# Patient Record
Sex: FEMALE | Race: WHITE | NOT HISPANIC OR LATINO | Employment: FULL TIME | URBAN - METROPOLITAN AREA
[De-identification: names, ages, dates, MRNs, and addresses within clinical notes are randomized per-mention and may not be internally consistent; named-entity substitution may affect disease eponyms.]

---

## 2021-02-25 ENCOUNTER — TELEPHONE (OUTPATIENT)
Dept: ENDOCRINOLOGY | Facility: CLINIC | Age: 51
End: 2021-02-25

## 2021-02-25 NOTE — TELEPHONE ENCOUNTER
Spoke with patient.  Wanted to know if the COVID vaccine would affect her labs.  Per Dr. Howell, not that he is aware of.  Patient verbalized understanding.

## 2021-02-25 NOTE — TELEPHONE ENCOUNTER
Pt is scheduled to be seen in the office 03/23, she wants to know if she should get her covid vaccine before or after her appt.   [phone]

## 2021-03-23 ENCOUNTER — OFFICE VISIT (OUTPATIENT)
Dept: ENDOCRINOLOGY | Facility: CLINIC | Age: 51
End: 2021-03-23

## 2021-03-23 VITALS
HEIGHT: 67 IN | DIASTOLIC BLOOD PRESSURE: 60 MMHG | WEIGHT: 152 LBS | BODY MASS INDEX: 23.86 KG/M2 | TEMPERATURE: 98.2 F | SYSTOLIC BLOOD PRESSURE: 118 MMHG

## 2021-03-23 DIAGNOSIS — E05.20 TOXIC MULTINODULAR GOITER: Primary | ICD-10-CM

## 2021-03-23 DIAGNOSIS — E06.3 HASHIMOTO'S THYROIDITIS: ICD-10-CM

## 2021-03-23 PROCEDURE — 76536 US EXAM OF HEAD AND NECK: CPT | Performed by: INTERNAL MEDICINE

## 2021-03-23 PROCEDURE — 99213 OFFICE O/P EST LOW 20 MIN: CPT | Performed by: INTERNAL MEDICINE

## 2021-03-23 RX ORDER — ESTRADIOL 0.05 MG/D
FILM, EXTENDED RELEASE TRANSDERMAL DAILY
COMMUNITY
Start: 2021-03-07 | End: 2022-03-28

## 2021-03-23 RX ORDER — LORATADINE 10 MG/1
TABLET ORAL AS NEEDED
COMMUNITY
End: 2022-03-28 | Stop reason: ALTCHOICE

## 2021-03-23 RX ORDER — ERGOCALCIFEROL (VITAMIN D2) 10 MCG
400 TABLET ORAL DAILY
COMMUNITY
End: 2021-09-22 | Stop reason: SDUPTHER

## 2021-03-23 RX ORDER — CLONIDINE HYDROCHLORIDE 0.2 MG/1
1 TABLET ORAL DAILY
COMMUNITY
Start: 2021-01-22

## 2021-03-23 NOTE — PROGRESS NOTES
"     Office Note      Date: 2021  Patient Name: Veronica Harvey  MRN: [id number]  : 1970    Chief Complaint   Patient presents with   • Follow-up   • Thyroid Problem       History of Present Illness:   Veronica Harvey is a 50 y.o. female who presents for Follow-up and Thyroid Problem    She isn't taking any thyroid meds. She denies any excess iodine intake. She denies any biotin use. She hasn't noted any change in the size of her neck. She denies any compressive sxs. She c/o fatigue - isn't sleeping well. She notes mild hot flashes.    Because of her family h/o thyroid cancer in her sister, she had neck u/s done 3/12/20 that showed multiple thyroid nodules bilaterally. She had thyroid scan 20 that showed mildly increased uptake with multiple bilateral hot nodules.     Subjective      Review of Systems:   Review of Systems   Constitutional: Positive for fatigue.   Cardiovascular: Negative.    Gastrointestinal: Negative.    Endocrine: Positive for heat intolerance.       The following portions of the patient's history were reviewed and updated as appropriate: allergies, current medications, past family history, past medical history, past social history, past surgical history and problem list.    Objective     Visit Vitals  /60   Temp 98.2 °F (36.8 °C) (Infrared)   Ht 170.2 cm (67\")   Wt 68.9 kg (152 lb)   BMI 23.81 kg/m²       Physical Exam:  Physical Exam  Constitutional:       Appearance: Normal appearance.   Neurological:      Mental Status: She is alert.         Labs:    TSH  No results found for: TSHBASE     Free T4  No results found for: FREET4    T3  No results found for: B2WPMQG      TPO  No results found for: THYROIDAB    TG AB  No results found for: THGAB    TG  No results found for: THYROGLB    CBC w/DIFF  No results found for: WBC, RBC, HGB, HCT, MCV, MCH, MCHC, RDW, RDWSD, MPV, PLT, NEUTRORELPCT, LYMPHORELPCT, MONORELPCT, EOSRELPCT, BASORELPCT, AUTOIGPER, NEUTROABS, LYMPHSABS, " MONOSABS, EOSABS, BASOSABS, AUTOIGNUM, NRBC        Assessment / Plan      Assessment & Plan:  Diagnoses and all orders for this visit:    1. Toxic multinodular goiter (Primary)  Assessment & Plan:  A neck u/s was performed today.  This revealed multiple bilateral thyroid nodules.  There were at least 4 nodules in each lobe.  The largest was anteriorly in the right lobe and was 1.4cm in size with mixed density with central hyperechoic area.  The nodules in the left lobe were all <1cm.  No abnormal lymph nodes were seen.    Orders:  -     US Thyroid    2. Hashimoto's thyroiditis  Assessment & Plan:  She had labs done last month.  The TSH was normal at 1.16.  The TPO and TG abs were high.          Return in about 6 months (around 9/23/2021) for Recheck with TSH, free T4.    Mingo Howell MD   03/23/2021

## 2021-03-23 NOTE — ASSESSMENT & PLAN NOTE
A neck u/s was performed today.  This revealed multiple bilateral thyroid nodules.  There were at least 4 nodules in each lobe.  The largest was anteriorly in the right lobe and was 1.4cm in size with mixed density with central hyperechoic area.  The nodules in the left lobe were all <1cm.  No abnormal lymph nodes were seen.

## 2021-04-06 ENCOUNTER — TELEPHONE (OUTPATIENT)
Dept: ENDOCRINOLOGY | Facility: CLINIC | Age: 51
End: 2021-04-06

## 2021-04-06 NOTE — TELEPHONE ENCOUNTER
PATIENT NEEDS DR. HARDEN TO SIGN OFF ON HER LAB RESULTS AND THYROID U/S REPORT IN ORDER FOR HER TO ACCESS THE REPORTS IN MY CHART. PLEASE SIGN OFF ON THESE REPORTS FOR PATIENT TO HAVE ACCESS TO SEE THEM.

## 2021-09-22 ENCOUNTER — OFFICE VISIT (OUTPATIENT)
Dept: ENDOCRINOLOGY | Facility: CLINIC | Age: 51
End: 2021-09-22

## 2021-09-22 VITALS
HEART RATE: 77 BPM | OXYGEN SATURATION: 97 % | SYSTOLIC BLOOD PRESSURE: 116 MMHG | HEIGHT: 67 IN | BODY MASS INDEX: 24.33 KG/M2 | DIASTOLIC BLOOD PRESSURE: 74 MMHG | WEIGHT: 155 LBS

## 2021-09-22 DIAGNOSIS — E06.3 HASHIMOTO'S THYROIDITIS: ICD-10-CM

## 2021-09-22 DIAGNOSIS — E05.20 TOXIC MULTINODULAR GOITER: Primary | ICD-10-CM

## 2021-09-22 PROCEDURE — 99213 OFFICE O/P EST LOW 20 MIN: CPT | Performed by: INTERNAL MEDICINE

## 2021-09-22 RX ORDER — PROGESTERONE 100 MG/1
100 CAPSULE ORAL DAILY
COMMUNITY
End: 2022-03-28

## 2021-09-22 RX ORDER — ERGOCALCIFEROL 1.25 MG/1
50000 CAPSULE ORAL WEEKLY
COMMUNITY

## 2021-09-22 NOTE — PROGRESS NOTES
"     Office Note      Date: 2021  Patient Name: Veronica Harvey  MRN: [id number]  : 1970    Chief Complaint   Patient presents with   • Goiter       History of Present Illness:   Veronica Harvey is a 51 y.o. female who presents for Goiter    She isn't taking any thyroid meds. She denies any excess iodine intake. She denies any biotin use. She hasn't noted any change in the size of her neck. She denies any compressive sxs. She c/o fatigue - isn't sleeping well. She notes mild hot flashes.    She had labs done last month.  TSH was 1.01.  T4 and T3 were normal.  TPO ab was again positive.     Because of her family h/o thyroid cancer in her sister, she had neck u/s done 3/12/20 that showed multiple thyroid nodules bilaterally. She had thyroid scan 20 that showed mildly increased uptake with multiple bilateral hot nodules. Neck u/s 3/2021 showed multiple bilateral thyroid nodules.    Subjective      Review of Systems:   Review of Systems   Constitutional: Negative.    Cardiovascular: Negative.    Gastrointestinal: Negative.    Endocrine: Positive for heat intolerance.       The following portions of the patient's history were reviewed and updated as appropriate: allergies, current medications, past family history, past medical history, past social history, past surgical history and problem list.    Objective     Visit Vitals  /74   Pulse 77   Ht 170.2 cm (67\")   Wt 70.3 kg (155 lb)   SpO2 97%   BMI 24.28 kg/m²       Physical Exam:  Physical Exam  Constitutional:       Appearance: Normal appearance.   Neck:      Thyroid: Thyroid mass present.      Comments: Thyroid firm and normal size with possible nodule in right lobe - slight larger than 1cm - freely movable  Lymphadenopathy:      Cervical: No cervical adenopathy.   Neurological:      Mental Status: She is alert.         Labs:    TSH  No results found for: TSHBASE     Free T4  No results found for: FREET4    T3  No results found for: [id number]      " TPO  No results found for: THYROIDAB    TG AB  No results found for: THGAB    TG  No results found for: THYROGLB    CBC w/DIFF  Lab Results   Component Value Date    WBC 3.8 (L) 06/24/2021    RBC 4.37 06/24/2021    HGB 13.3 06/24/2021    HCT 39.0 06/24/2021    MCV 89.2 06/24/2021    MCH 30.5 06/24/2021    MCHC 34.3 06/24/2021    RDW 12.4 06/24/2021    MPV 8.0 06/24/2021     06/24/2021    NEUTRORELPCT 45.7 06/24/2021    MONORELPCT 8.6 06/24/2021    EOSRELPCT 1.4 06/24/2021    BASORELPCT 0.6 06/24/2021    NEUTROABS 1.7 06/24/2021    LYMPHSABS 1.7 06/24/2021    MONOSABS 0.3 06/24/2021    EOSABS 0.1 06/24/2021    BASOSABS 0.0 06/24/2021           Assessment / Plan      Assessment & Plan:  Diagnoses and all orders for this visit:    1. Toxic multinodular goiter (Primary)  Assessment & Plan:  Goiter stable tto palpation.  Plan for another u/s in 6 months.    Orders:  -     Cancel: TSH; Future  -     Cancel: T4, Free; Future    2. Hashimoto's thyroiditis  Assessment & Plan:  She remains euthyroid.  Continue to monitor TSH.        Return in about 6 months (around 3/22/2022) for Recheck with TSH, free T4, neck u/s.    Mingo Howell MD   09/22/2021

## 2022-03-28 ENCOUNTER — OFFICE VISIT (OUTPATIENT)
Dept: ENDOCRINOLOGY | Facility: CLINIC | Age: 52
End: 2022-03-28

## 2022-03-28 VITALS
SYSTOLIC BLOOD PRESSURE: 124 MMHG | HEIGHT: 67 IN | DIASTOLIC BLOOD PRESSURE: 72 MMHG | HEART RATE: 80 BPM | BODY MASS INDEX: 24.33 KG/M2 | OXYGEN SATURATION: 97 % | WEIGHT: 155 LBS

## 2022-03-28 DIAGNOSIS — E05.20 TOXIC MULTINODULAR GOITER: Primary | ICD-10-CM

## 2022-03-28 DIAGNOSIS — E06.3 HASHIMOTO'S THYROIDITIS: ICD-10-CM

## 2022-03-28 PROCEDURE — 76536 US EXAM OF HEAD AND NECK: CPT | Performed by: INTERNAL MEDICINE

## 2022-03-28 PROCEDURE — 99213 OFFICE O/P EST LOW 20 MIN: CPT | Performed by: INTERNAL MEDICINE

## 2022-03-28 RX ORDER — HYDROXYZINE PAMOATE 25 MG/1
25 CAPSULE ORAL AS NEEDED
COMMUNITY
Start: 2022-03-18

## 2022-03-28 RX ORDER — ESTRADIOL 0.04 MG/D
1 FILM, EXTENDED RELEASE TRANSDERMAL 2 TIMES DAILY
COMMUNITY
Start: 2022-01-26

## 2022-03-28 RX ORDER — CHOLECALCIFEROL (VITAMIN D3) 25 MCG
1 TABLET,CHEWABLE ORAL DAILY
COMMUNITY
Start: 2022-03-18

## 2022-03-28 NOTE — PROGRESS NOTES
"     Office Note      Date: 2022  Patient Name: Veronica Harvey  MRN: [id number]  : 1970    Chief Complaint   Patient presents with   • Goiter       History of Present Illness:   Veronica Harvey is a 51 y.o. female who presents for Goiter    She isn't taking any thyroid meds. She denies any excess iodine intake. She denies any biotin use. She hasn't noted any change in the size of her neck. She denies any compressive sxs. She c/o fatigue - isn't sleeping well.  She has been started on vistaril recently but stopped it due to side effects. She notes mild hot flashes.     Because of her family h/o thyroid cancer in her sister, she had neck u/s done 3/12/20 that showed multiple thyroid nodules bilaterally. She had thyroid scan 20 that showed mildly increased uptake with multiple bilateral hot nodules. Neck u/s 3/2021 showed multiple bilateral thyroid nodules.    Subjective      Review of Systems:   Review of Systems   Constitutional: Positive for fatigue.   Cardiovascular: Negative.    Gastrointestinal: Negative.    Endocrine: Negative.        The following portions of the patient's history were reviewed and updated as appropriate: allergies, current medications, past family history, past medical history, past social history, past surgical history and problem list.    Objective     Visit Vitals  /72   Pulse 80   Ht 170.2 cm (67\")   Wt 70.3 kg (155 lb)   SpO2 97%   BMI 24.28 kg/m²       Physical Exam:  Physical Exam  Constitutional:       Appearance: Normal appearance.   Neurological:      Mental Status: She is alert.         Labs:    TSH  No results found for: TSHBASE     Free T4  No results found for: FREET4    T3  No results found for: Q3PHMIE      TPO  No results found for: THYROIDAB    TG AB  No results found for: THGAB    TG  No results found for: THYROGLB    CBC w/DIFF  Lab Results   Component Value Date    WBC 3.8 (L) 2021    RBC 4.37 2021    HGB 13.3 2021    HCT 39.0 " 06/24/2021    MCV 89.2 06/24/2021    MCH 30.5 06/24/2021    MCHC 34.3 06/24/2021    RDW 12.4 06/24/2021    MPV 8.0 06/24/2021     06/24/2021    NEUTRORELPCT 45.7 06/24/2021    MONORELPCT 8.6 06/24/2021    EOSRELPCT 1.4 06/24/2021    BASORELPCT 0.6 06/24/2021    NEUTROABS 1.7 06/24/2021    LYMPHSABS 1.7 06/24/2021    MONOSABS 0.3 06/24/2021    EOSABS 0.1 06/24/2021    BASOSABS 0.0 06/24/2021           Assessment / Plan      Assessment & Plan:  Diagnoses and all orders for this visit:    1. Toxic multinodular goiter (Primary)  Assessment & Plan:  A neck u/s was performed today.  This revealed multiple bilateral thyroid nodules.  The largest was mixed density and in the right lobe.  It measured 1.6cm.  This may be slightly larger than u/s done 3/2021.  The largest in the left lobe was <1cm in size.  No abnormal lymph nodes were seen.  This appears relatively stable.      Plan for another u/s in a year.    Orders:  -     Cancel: TSH; Future  -     Cancel: T4, Free; Future  -     US Thyroid    2. Hashimoto's thyroiditis  Assessment & Plan:  Recent labs show she is euthyroid.  Thyroid antibodies have decreased some.          Return in about 1 year (around 3/28/2023) for Recheck with TSH, free T4, neck u/s.    Mingo Howell MD   03/28/2022

## 2022-03-28 NOTE — ASSESSMENT & PLAN NOTE
A neck u/s was performed today.  This revealed multiple bilateral thyroid nodules.  The largest was mixed density and in the right lobe.  It measured 1.6cm.  This may be slightly larger than u/s done 3/2021.  The largest in the left lobe was <1cm in size.  No abnormal lymph nodes were seen.  This appears relatively stable.      Plan for another u/s in a year.

## 2024-01-11 ENCOUNTER — APPOINTMENT (OUTPATIENT)
Dept: URBAN - NONMETROPOLITAN AREA SURGERY 9 | Age: 54
Setting detail: DERMATOLOGY
End: 2024-01-11

## 2024-01-11 DIAGNOSIS — D49.2 NEOPLASM OF UNSPECIFIED BEHAVIOR OF BONE, SOFT TISSUE, AND SKIN: ICD-10-CM

## 2024-01-11 DIAGNOSIS — Z71.89 OTHER SPECIFIED COUNSELING: ICD-10-CM

## 2024-01-11 DIAGNOSIS — L57.0 ACTINIC KERATOSIS: ICD-10-CM

## 2024-01-11 PROCEDURE — 11104 PUNCH BX SKIN SINGLE LESION: CPT

## 2024-01-11 PROCEDURE — OTHER LIQUID NITROGEN: OTHER

## 2024-01-11 PROCEDURE — 17000 DESTRUCT PREMALG LESION: CPT | Mod: 59

## 2024-01-11 PROCEDURE — 99202 OFFICE O/P NEW SF 15 MIN: CPT | Mod: 25

## 2024-01-11 PROCEDURE — OTHER COUNSELING: OTHER

## 2024-01-11 PROCEDURE — OTHER BIOPSY BY PUNCH METHOD: OTHER

## 2024-01-11 ASSESSMENT — LOCATION SIMPLE DESCRIPTION DERM
LOCATION SIMPLE: RIGHT POSTERIOR UPPER ARM
LOCATION SIMPLE: RIGHT FOREARM

## 2024-01-11 ASSESSMENT — LOCATION DETAILED DESCRIPTION DERM
LOCATION DETAILED: RIGHT DISTAL DORSAL FOREARM
LOCATION DETAILED: RIGHT PROXIMAL POSTERIOR UPPER ARM

## 2024-01-11 ASSESSMENT — LOCATION ZONE DERM: LOCATION ZONE: ARM

## 2024-01-11 NOTE — HPI: SKIN LESION
What Type Of Note Output Would You Prefer (Optional)?: Bullet Format
How Severe Is Your Skin Lesion?: mild
Is This A New Presentation, Or A Follow-Up?: Skin Lesion
Which Family Member (Optional)?: Grandfather

## 2024-01-11 NOTE — PROCEDURE: BIOPSY BY PUNCH METHOD

## 2024-01-26 ENCOUNTER — APPOINTMENT (OUTPATIENT)
Dept: URBAN - NONMETROPOLITAN AREA SURGERY 9 | Age: 54
Setting detail: DERMATOLOGY
End: 2024-01-27

## 2024-01-26 DIAGNOSIS — L57.0 ACTINIC KERATOSIS: ICD-10-CM

## 2024-01-26 DIAGNOSIS — Z48.02 ENCOUNTER FOR REMOVAL OF SUTURES: ICD-10-CM

## 2024-01-26 PROCEDURE — OTHER COUNSELING: OTHER

## 2024-01-26 PROCEDURE — 17000 DESTRUCT PREMALG LESION: CPT

## 2024-01-26 PROCEDURE — OTHER LIQUID NITROGEN: OTHER

## 2024-01-26 PROCEDURE — OTHER SUTURE REMOVAL (GLOBAL PERIOD): OTHER

## 2024-01-26 PROCEDURE — OTHER PATHOLOGY DISCUSSION: OTHER

## 2024-01-26 ASSESSMENT — LOCATION DETAILED DESCRIPTION DERM
LOCATION DETAILED: RIGHT DISTAL POSTERIOR UPPER ARM
LOCATION DETAILED: RIGHT DISTAL DORSAL FOREARM
LOCATION DETAILED: RIGHT DISTAL POSTERIOR UPPER ARM

## 2024-01-26 ASSESSMENT — LOCATION ZONE DERM
LOCATION ZONE: ARM
LOCATION ZONE: ARM

## 2024-01-26 ASSESSMENT — LOCATION SIMPLE DESCRIPTION DERM
LOCATION SIMPLE: RIGHT FOREARM
LOCATION SIMPLE: RIGHT POSTERIOR UPPER ARM
LOCATION SIMPLE: RIGHT POSTERIOR UPPER ARM

## 2024-01-26 NOTE — PROCEDURE: SUTURE REMOVAL (GLOBAL PERIOD)
Detail Level: Detailed
Add 07048 Cpt? (Important Note: In 2017 The Use Of 63734 Is Being Tracked By Cms To Determine Future Global Period Reimbursement For Global Periods): no